# Patient Record
Sex: MALE | Race: WHITE | Employment: FULL TIME | URBAN - NONMETROPOLITAN AREA
[De-identification: names, ages, dates, MRNs, and addresses within clinical notes are randomized per-mention and may not be internally consistent; named-entity substitution may affect disease eponyms.]

---
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
## 2022-01-12 ENCOUNTER — HOSPITAL ENCOUNTER (EMERGENCY)
Age: 22
Discharge: HOME | End: 2022-01-12

## 2022-01-12 VITALS
DIASTOLIC BLOOD PRESSURE: 72 MMHG | OXYGEN SATURATION: 99 % | RESPIRATION RATE: 14 BRPM | HEART RATE: 90 BPM | TEMPERATURE: 97.2 F | SYSTOLIC BLOOD PRESSURE: 149 MMHG | BODY MASS INDEX: 28.6 KG/M2 | WEIGHT: 230 LBS | HEIGHT: 75 IN

## 2022-01-12 DIAGNOSIS — J06.9 UPPER RESPIRATORY TRACT INFECTION DUE TO COVID-19 VIRUS: Primary | ICD-10-CM

## 2022-01-12 DIAGNOSIS — U07.1 UPPER RESPIRATORY TRACT INFECTION DUE TO COVID-19 VIRUS: Primary | ICD-10-CM

## 2022-01-12 LAB — SARS-COV-2, NAA: DETECTED

## 2022-01-12 PROCEDURE — 87635 SARS-COV-2 COVID-19 AMP PRB: CPT

## 2022-01-12 PROCEDURE — 99213 OFFICE O/P EST LOW 20 MIN: CPT

## 2022-01-12 PROCEDURE — 99202 OFFICE O/P NEW SF 15 MIN: CPT | Performed by: NURSE PRACTITIONER

## 2022-01-12 RX ORDER — ASCORBIC ACID 500 MG
500 TABLET ORAL 2 TIMES DAILY
Qty: 14 TABLET | Refills: 0 | COMMUNITY
Start: 2022-01-12 | End: 2022-05-26

## 2022-01-12 RX ORDER — ZINC SULFATE 50(220)MG
50 CAPSULE ORAL DAILY
Qty: 7 CAPSULE | Refills: 0 | COMMUNITY
Start: 2022-01-12 | End: 2022-05-26

## 2022-01-12 RX ORDER — PSEUDOEPHEDRINE HYDROCHLORIDE 30 MG/1
30 TABLET ORAL EVERY 4 HOURS PRN
Refills: 0 | COMMUNITY
Start: 2022-01-12 | End: 2022-01-15

## 2022-01-12 RX ORDER — ACETAMINOPHEN 500 MG
500 TABLET ORAL EVERY 4 HOURS PRN
Qty: 20 TABLET | Refills: 0 | COMMUNITY
Start: 2022-01-12 | End: 2022-05-26

## 2022-01-12 RX ORDER — AZELASTINE 1 MG/ML
1 SPRAY, METERED NASAL 2 TIMES DAILY
Qty: 30 ML | Refills: 0 | Status: SHIPPED | OUTPATIENT
Start: 2022-01-12 | End: 2022-05-26

## 2022-01-12 RX ORDER — DEXTROMETHORPHAN HYDROBROMIDE AND PROMETHAZINE HYDROCHLORIDE 15; 6.25 MG/5ML; MG/5ML
5 SYRUP ORAL 4 TIMES DAILY PRN
Qty: 100 ML | Refills: 0 | Status: SHIPPED | OUTPATIENT
Start: 2022-01-12 | End: 2022-01-17

## 2022-01-12 ASSESSMENT — ENCOUNTER SYMPTOMS
WHEEZING: 0
NAUSEA: 0
CHOKING: 0
VOMITING: 0
COUGH: 1
SHORTNESS OF BREATH: 0
RHINORRHEA: 1
APNEA: 0
SORE THROAT: 0
CHEST TIGHTNESS: 0
FLU SYMPTOMS: 1
DIARRHEA: 0
SINUS PRESSURE: 1
STRIDOR: 0

## 2022-01-12 ASSESSMENT — PAIN DESCRIPTION - FREQUENCY: FREQUENCY: CONTINUOUS

## 2022-01-12 ASSESSMENT — PAIN DESCRIPTION - PAIN TYPE: TYPE: ACUTE PAIN

## 2022-01-12 ASSESSMENT — PAIN DESCRIPTION - LOCATION: LOCATION: HEAD;GENERALIZED

## 2022-01-12 ASSESSMENT — PAIN DESCRIPTION - DESCRIPTORS: DESCRIPTORS: ACHING

## 2022-01-12 ASSESSMENT — PAIN SCALES - GENERAL: PAINLEVEL_OUTOF10: 3

## 2022-01-12 NOTE — ED NOTES
Pt verbalized discharge instructions. Pt informed to go to ER if develop chest pain, shortness of breath or abdominal pain. Pt ambulatory out in stable condition. Assessment unchanged.        Yovany Archibald RN  01/12/22 8106

## 2022-01-12 NOTE — ED PROVIDER NOTES
[facility]  Urgent Care Encounter      CHIEF COMPLAINT       Chief Complaint   Patient presents with    Concern For COVID-19     cough headache dizzy and body aches  this am       Nurses Notes reviewed and I agree except as noted in the HPI. HISTORY OFPRESENT ILLNESS   Zita Galeana is a 25 y.o. The history is provided by the patient. No  was used. Influenza  Presenting symptoms: cough, fatigue, headache, myalgias and rhinorrhea    Presenting symptoms: no diarrhea, no fever, no nausea, no shortness of breath, no sore throat and no vomiting    Severity:  Moderate  Onset quality:  Sudden  Progression:  Unchanged  Chronicity:  New  Relieved by:  Nothing  Worsened by:  Nothing  Ineffective treatments:  None tried  Associated symptoms: chills, decreased appetite, decreased physical activity and nasal congestion    Associated symptoms: no ear pain, no mental status change, no neck stiffness and no syncope    Risk factors: not elderly, no diabetes problem, no heart disease, no immunocompromised state, no kidney disease, no liver disease, not pregnant and no sick contacts        REVIEW OF SYSTEMS     Review of Systems   Constitutional: Positive for chills, decreased appetite and fatigue. Negative for activity change, appetite change, diaphoresis and fever. HENT: Positive for congestion, postnasal drip, rhinorrhea and sinus pressure. Negative for ear pain and sore throat. Respiratory: Positive for cough. Negative for apnea, choking, chest tightness, shortness of breath, wheezing and stridor. Cardiovascular: Negative for chest pain, palpitations and leg swelling. Gastrointestinal: Negative for diarrhea, nausea and vomiting. Musculoskeletal: Positive for myalgias. Negative for neck stiffness. Neurological: Positive for headaches. Negative for dizziness and light-headedness.        PAST MEDICAL HISTORY         Diagnosis Date    ADHD     Headache        SURGICAL HISTORY     Patient  has no past surgical history on file. CURRENT MEDICATIONS       Previous Medications    No medications on file       ALLERGIES     Patient is has No Known Allergies. FAMILY HISTORY     Patient's family history is not on file. SOCIAL HISTORY     Patient  reports that he has been smoking cigarettes. He has been smoking about 0.50 packs per day. He uses smokeless tobacco. He reports current drug use. Frequency: 2.00 times per week. Drug: Marijuana Alok Meckel). He reports that he does not drink alcohol. PHYSICAL EXAM     ED TRIAGE VITALS  BP: (!) 149/72, Temp: 97.2 °F (36.2 °C), Heart Rate: 90, Resp: 14, SpO2: 99 %  Physical Exam  Vitals and nursing note reviewed. Constitutional:       General: He is not in acute distress. Appearance: Normal appearance. He is normal weight. He is not ill-appearing, toxic-appearing or diaphoretic. HENT:      Head: Normocephalic and atraumatic. Right Ear: External ear normal.      Left Ear: External ear normal.   Eyes:      Extraocular Movements: Extraocular movements intact. Conjunctiva/sclera: Conjunctivae normal.   Pulmonary:      Effort: Pulmonary effort is normal. No respiratory distress. Breath sounds: Normal breath sounds. No stridor. No wheezing, rhonchi or rales. Chest:      Chest wall: No tenderness. Musculoskeletal:         General: Normal range of motion. Cervical back: Normal range of motion. Skin:     General: Skin is warm. Neurological:      General: No focal deficit present. Mental Status: He is alert and oriented to person, place, and time. Psychiatric:         Mood and Affect: Mood normal.         Behavior: Behavior normal.         Thought Content:  Thought content normal.         Judgment: Judgment normal.         DIAGNOSTIC RESULTS   Labs:  Results for orders placed or performed during the hospital encounter of 01/12/22   COVID-19, Rapid   Result Value Ref Range    SARS-CoV-2, ROBERT DETECTED (AA) NOT DETECTED       IMAGING:  No orders to display     URGENT CARE COURSE:     Vitals:    01/12/22 1016   BP: (!) 149/72   Pulse: 90   Resp: 14   Temp: 97.2 °F (36.2 °C)   TempSrc: Temporal   SpO2: 99%   Weight: 230 lb (104.3 kg)   Height: (!) 6' 3\" (1.905 m)       Medications - No data to display  PROCEDURES:  None  FINAL IMPRESSION      1. Upper respiratory tract infection due to COVID-19 virus        DISPOSITION/PLAN   Decision To Discharge    You are COVID-19 positive. You will be contacted by the [address] and/or the [facility] Department regarding length of quarantine when you may return to work and/or school. When to seek immediate emergency medical attention:    Uncontrollable fever  Trouble breathing  Persistent pain or pressure in the chest  New confusion  Inability to wake or stay awake  Pale, gray, or blue-colored skin, lips, or nail beds, depending on skin tone  *This list is not all possible symptoms. Please call your medical provider for any other symptoms that are severe or concerning to you. May take over-the-counter supplements daily if no contraindications:  Multi-vitamin/multi-mineral daily   Vitamin D3 4000 IU daily  Zinc 75 mg daily  Vitamin C 1000 mg twice daily  B-100 complex as daily as directed on bottle  Gargle with mouthwash 3 times daily, may use Scope, Crest, or Listerine.           PATIENT REFERRED TO:  [address] [place] [id number]  Call today  [address] [facility] [address] [phone]  Go today  If symptoms worsen    DISCHARGE MEDICATIONS:  New Prescriptions    ACETAMINOPHEN (TYLENOL) 500 MG TABLET    Take 1 tablet by mouth every 4 hours as needed for Pain or Fever    ASCORBIC ACID (VITAMIN C) 500 MG TABLET    Take 1 tablet by mouth 2 times daily for 7 days    AZELASTINE (ASTELIN) 0.1 % NASAL SPRAY    1 spray by Nasal route 2 times daily Use in each nostril as directed    PROMETHAZINE-DEXTROMETHORPHAN (PROMETHAZINE-DM) 6.25-15 MG/5ML SYRUP    Take 5 mLs by mouth 4 times daily as needed for Cough    PSEUDOEPHEDRINE (SUDAFED) 30 MG TABLET    Take 1 tablet by mouth every 4 hours as needed for Congestion    ZINC SULFATE (ZINCATE) 220 (50 ZN) MG CAPSULE    Take 1 capsule by mouth daily for 7 days     Current Discharge Medication List          DYLAN Price - DYLAN Morales - WILFRID  01/12/22 1781

## 2022-01-12 NOTE — Clinical Note
Diandra Rodriguez was seen and treated in our emergency department on 1/12/2022. COVID19 virus is suspected. Per the CDC guidelines we recommend home isolation until the following conditions are all met:    1. At least five days have passed since symptoms first appeared and/or had a close exposure,   2. After home isolation for five days, wearing a mask around others for the next five days,  3. At least 24 have passed since last fever without the use of fever-reducing medications and  4. Symptoms (eg cough, shortness of breath) have improved    If you have any questions or concerns, please don't hesitate to call.     He may return to work/school on 01/18/2022        Lala Fabry, DYLAN - CNP

## 2022-05-26 ENCOUNTER — HOSPITAL ENCOUNTER (EMERGENCY)
Age: 22
Discharge: HOME | End: 2022-05-26

## 2022-05-26 VITALS
OXYGEN SATURATION: 97 % | DIASTOLIC BLOOD PRESSURE: 71 MMHG | BODY MASS INDEX: 29.09 KG/M2 | RESPIRATION RATE: 16 BRPM | HEIGHT: 75 IN | WEIGHT: 234 LBS | TEMPERATURE: 98 F | SYSTOLIC BLOOD PRESSURE: 109 MMHG | HEART RATE: 82 BPM

## 2022-05-26 DIAGNOSIS — J30.9 ALLERGIC RHINITIS, UNSPECIFIED SEASONALITY, UNSPECIFIED TRIGGER: Primary | ICD-10-CM

## 2022-05-26 DIAGNOSIS — R10.13 DYSPEPSIA: ICD-10-CM

## 2022-05-26 LAB
FLU A ANTIGEN: NEGATIVE
INFLUENZA B AG, EIA: NEGATIVE

## 2022-05-26 PROCEDURE — 87804 INFLUENZA ASSAY W/OPTIC: CPT

## 2022-05-26 PROCEDURE — 99213 OFFICE O/P EST LOW 20 MIN: CPT | Performed by: EMERGENCY MEDICINE

## 2022-05-26 PROCEDURE — 99213 OFFICE O/P EST LOW 20 MIN: CPT

## 2022-05-26 RX ORDER — LORATADINE 10 MG/1
10 TABLET ORAL DAILY
Qty: 30 TABLET | Refills: 0 | Status: SHIPPED | OUTPATIENT
Start: 2022-05-26

## 2022-05-26 RX ORDER — FAMOTIDINE 20 MG/1
20 TABLET, FILM COATED ORAL 2 TIMES DAILY
Qty: 60 TABLET | Refills: 3 | Status: SHIPPED | OUTPATIENT
Start: 2022-05-26

## 2022-05-26 ASSESSMENT — ENCOUNTER SYMPTOMS
DIARRHEA: 1
RHINORRHEA: 1
ABDOMINAL PAIN: 1
COUGH: 1

## 2022-05-26 ASSESSMENT — PAIN - FUNCTIONAL ASSESSMENT: PAIN_FUNCTIONAL_ASSESSMENT: NONE - DENIES PAIN

## 2022-05-26 NOTE — ED TRIAGE NOTES
Pt to [facility] ambulatory with fatigue, headache, nausea, and vomiting x 3 today. This started yesterday.

## 2022-05-26 NOTE — Clinical Note
Romayne Slocumb was seen and treated in our emergency department on 5/26/2022. He may return to work on 05/27/2022. If you have any questions or concerns, please don't hesitate to call.       Kiersten Reeves, DYLAN - CNP

## 2022-05-26 NOTE — ED PROVIDER NOTES
EXAM     ED TRIAGE VITALS  BP: 109/71, Temp: 98 °F (36.7 °C), Heart Rate: 82, Resp: 16, SpO2: 97 %,Estimated body mass index is 29.25 kg/m² as calculated from the following:    Height as of this encounter: 6' 3\" (1.905 m). Weight as of this encounter: 234 lb (106.1 kg). ,No LMP for male patient. Physical Exam  Constitutional:       Appearance: Normal appearance. He is not ill-appearing. HENT:      Head: Normocephalic. Nose: Congestion and rhinorrhea present. Mouth/Throat:      Mouth: Mucous membranes are moist.      Pharynx: Oropharynx is clear. No oropharyngeal exudate. Cardiovascular:      Rate and Rhythm: Normal rate and regular rhythm. Pulses: Normal pulses. Heart sounds: Normal heart sounds. Pulmonary:      Effort: Pulmonary effort is normal. No respiratory distress. Breath sounds: Normal breath sounds. No wheezing or rhonchi. Abdominal:      Tenderness: There is abdominal tenderness in the epigastric area. Negative signs include Tiwari's sign and McBurney's sign. Skin:     General: Skin is warm and dry. Capillary Refill: Capillary refill takes less than 2 seconds. Neurological:      General: No focal deficit present. Mental Status: He is alert. Psychiatric:         Mood and Affect: Mood normal.         Behavior: Behavior normal.         DIAGNOSTIC RESULTS     Labs:  Results for orders placed or performed during the hospital encounter of 05/26/22   Rapid influenza A/B antigens   Result Value Ref Range    Flu A Antigen Negative NEGATIVE    Influenza B Ag, EIA Negative NEGATIVE       IMAGING:    No orders to display         EKG:      URGENT CARE COURSE:     Vitals:    05/26/22 1001   BP: 109/71   Pulse: 82   Resp: 16   Temp: 98 °F (36.7 °C)   TempSrc: Temporal   SpO2: 97%   Weight: 234 lb (106.1 kg)   Height: 6' 3\" (1.905 m)       Medications - No data to display         PROCEDURES:  None    FINAL IMPRESSION      1.  Allergic rhinitis, unspecified seasonality, unspecified trigger    2. Dyspepsia          DISPOSITION/ PLAN     Patient presents for symptoms of viral illness/possible allergy induced rhinitis. Symptoms x2 days. Influenza and COVID testing ordered, however patient refused the COVID testing. Influenza negative. Will treat with daily Claritin. Pepcid for his epigastric discomfort. Advise follow-up with a family medicine residency clinic to establish a primary care provider. The patient is concerned of possible black mold exposure at the house that he has resided in for the past 1 year. I advised that he may have testing performed by an allergist if needed. This would have to be set up through a primary care provider. He is given the phone number for family medicine residency clinic. He is advised to call today for an appointment. He is given an off work slip today and advised he can return tomorrow. PATIENT REFERRED TO:  No primary care provider on file. No primary physician on file.       DISCHARGE MEDICATIONS:  Discharge Medication List as of 5/26/2022 10:34 AM      START taking these medications    Details   famotidine (PEPCID) 20 MG tablet Take 1 tablet by mouth 2 times daily, Disp-60 tablet, R-3Normal      loratadine (CLARITIN) 10 MG tablet Take 1 tablet by mouth daily, Disp-30 tablet, R-0Normal             Discharge Medication List as of 5/26/2022 10:34 AM      STOP taking these medications       acetaminophen (TYLENOL) 500 MG tablet Comments:   Reason for Stopping:         ascorbic acid (VITAMIN C) 500 MG tablet Comments:   Reason for Stopping:         zinc sulfate (ZINCATE) 220 (50 Zn) MG capsule Comments:   Reason for Stopping:         azelastine (ASTELIN) 0.1 % nasal spray Comments:   Reason for Stopping:               Discharge Medication List as of 5/26/2022 10:34 AM          DYLAN Wilkes CNP    (Please note that portions of this note were completed with a voice recognition program. Efforts were made to edit the dictations but occasionally words are mis-transcribed.)          Ann-Marie Driscoll, DYLAN - CNP  05/26/22 1115

## 2022-05-26 NOTE — ED NOTES
Flu test obtained and sent to lab.   Pt refused COVID-19 test.     Madelyn Donaldson RN  05/26/22 1016